# Patient Record
Sex: MALE | Race: WHITE | URBAN - METROPOLITAN AREA
[De-identification: names, ages, dates, MRNs, and addresses within clinical notes are randomized per-mention and may not be internally consistent; named-entity substitution may affect disease eponyms.]

---

## 2017-11-28 ENCOUNTER — TRANSFERRED RECORDS (OUTPATIENT)
Dept: HEALTH INFORMATION MANAGEMENT | Facility: CLINIC | Age: 62
End: 2017-11-28

## 2017-12-18 ENCOUNTER — TRANSFERRED RECORDS (OUTPATIENT)
Dept: HEALTH INFORMATION MANAGEMENT | Facility: CLINIC | Age: 62
End: 2017-12-18

## 2017-12-19 ENCOUNTER — TRANSFERRED RECORDS (OUTPATIENT)
Dept: HEALTH INFORMATION MANAGEMENT | Facility: CLINIC | Age: 62
End: 2017-12-19

## 2017-12-21 ENCOUNTER — TRANSFERRED RECORDS (OUTPATIENT)
Dept: HEALTH INFORMATION MANAGEMENT | Facility: CLINIC | Age: 62
End: 2017-12-21

## 2017-12-27 ENCOUNTER — CARE COORDINATION (OUTPATIENT)
Dept: GASTROENTEROLOGY | Facility: CLINIC | Age: 62
End: 2017-12-27

## 2017-12-27 NOTE — LETTER
January 10, 2018    Luis Angel TIPTON Sender                              [address] [facility] [address]    Dear Luis Angel,     I am unable to get a hold of you as the address are conflicting and the phone numbers we were given do not work/or have a lack of voicemail.       We received a referral for you to be seen here at the [facility].   After review of your records, we would like you to have the following tests/procedures.   1. Secretin MRCP   2. EUS- Endoscopic ultrasound  3. Clinic visit with Dr. Portillo.    Please call our office to review this plan and to get scheduled.     Thank you,   Aliyah STRAUSS, RN Coordinator  Dr. Cedillo, Dr. Portillo & Dr. Aragon   [facility]  [phone]

## 2017-12-27 NOTE — PROGRESS NOTES
[facility] clinic intake:    [facility] Dr[facility] J Moise is wanting to speak with/have Dr. Portillo review this patient's medical records. The nurse said that it's a possible referral, but mainly wants to discuss the case with Dr. Portillo. I wasn't sure how to go about this- so Mariella told me to fill this out best I could and notify you guys.     Referring/Requesting provider and Health care System: Dr. Juan J Moise at [facility] # to have Dr. Moise Paged: [phone]     Requested provider (if specified): Dr. Portillo     Has patient been evaluated in clinic or had a procedure [facility] provider in the last 5 years: No       Indication/Diagnosis for consultation: Gastric Outlet Obstruction is main diagnosis per Hospital Nurse     Has patient been evaluated by another Gastroenterologist? Unknown     Imaging completed:     CT scan     Yes   MRI       No     Has patient been evaluated by another Gastroenterologist? Unknown     Procedures:     Upper Endoscopy/EGD No     Endoscopic Ultrasound/EUS            No     ERCP No     Colonoscopy No       Are images able/being pushed to our system? Yes       Referral Received:  12/22/2017 12/26/2017: film called to grab images. Message sent to Dr. Portillo to page referring provider to discuss as they requested. Message received from Dr. Portillo that he called and left his number for MD to call him back.     Hard Copy chart created/ Records received: 12/27/2017    MD review date:                             Clinical History (per RN review of records provided):   62 year old Male with PMH of bladder CA, s/p resection, DM 2, CAD.     CT and MRI in PACS for review.    Recommendations/Orders:    Hard copy chart reviewed by Dr. Portillo:  1. MRCP - biliary stone?  2. EUS   3. Clinic with Dr. Portillo    Orders placed for MRCP/EUS.   Unable to get a hold of patient: The demographics information in EPIC does not  match the demographics in the hard copy chart. Tried to call all three number listed: no answer and there is no voicemail set up on any of the numbers. Message left for referring MD to see if there is anymore information for contacts.     - Letter sent to address listed in Derek.     Aliyah STRAUSS RN Coordinator  Dr. Cedillo, Dr. Portillo & Dr. Aragon   [facility]  [phone]

## 2017-12-27 NOTE — PROGRESS NOTES
41 Pages of medical records received.   Hard copy folder created and placed in referral bin alphabetically.   Will route not to [facility].    SR 12.27.17 @ 181v

## 2018-01-11 ENCOUNTER — CARE COORDINATION (OUTPATIENT)
Dept: GASTROENTEROLOGY | Facility: CLINIC | Age: 63
End: 2018-01-11

## 2018-01-11 NOTE — PROGRESS NOTES
Called and spoke to Luis Angel and then his significant other- Evon. Explained the plan. She would like to make sure this is covered under his insurance and then they will call me back.   - Number given for financial counselor and direct line for her to call this RN.     Aliyah STRAUSS RN Coordinator  Dr. Cedillo, Dr. Portillo & Dr. Aragon   [facility]  [phone]

## 2018-03-21 ENCOUNTER — CARE COORDINATION (OUTPATIENT)
Dept: GASTROENTEROLOGY | Facility: CLINIC | Age: 63
End: 2018-03-21

## 2018-03-21 NOTE — PROGRESS NOTES
Have not heard back from patient or family.   -Hard copy chart sent to be scanned into [employer].     Aliyah STRAUSS RN Coordinator  Dr. Cedillo, Dr. Portillo & Dr. Aragon   [facility]  [phone]